# Patient Record
Sex: MALE | Race: WHITE | Employment: FULL TIME | URBAN - METROPOLITAN AREA
[De-identification: names, ages, dates, MRNs, and addresses within clinical notes are randomized per-mention and may not be internally consistent; named-entity substitution may affect disease eponyms.]

---

## 2024-05-29 ENCOUNTER — OFFICE VISIT (OUTPATIENT)
Dept: VASCULAR SURGERY | Age: 54
End: 2024-05-29
Payer: COMMERCIAL

## 2024-05-29 VITALS
RESPIRATION RATE: 16 BRPM | SYSTOLIC BLOOD PRESSURE: 151 MMHG | HEIGHT: 77 IN | BODY MASS INDEX: 37.19 KG/M2 | WEIGHT: 315 LBS | TEMPERATURE: 97.3 F | HEART RATE: 69 BPM | DIASTOLIC BLOOD PRESSURE: 71 MMHG

## 2024-05-29 DIAGNOSIS — I87.333 CHRONIC VENOUS HYPERTENSION (IDIOPATHIC) WITH ULCER AND INFLAMMATION OF BILATERAL LOWER EXTREMITY (HCC): Primary | ICD-10-CM

## 2024-05-29 PROCEDURE — 99203 OFFICE O/P NEW LOW 30 MIN: CPT | Performed by: SURGERY

## 2024-05-29 RX ORDER — GLIPIZIDE 5 MG/1
10 TABLET, FILM COATED, EXTENDED RELEASE ORAL DAILY
COMMUNITY
Start: 2024-04-30

## 2024-05-29 RX ORDER — AMLODIPINE BESYLATE 10 MG/1
10 TABLET ORAL DAILY
COMMUNITY

## 2024-05-29 RX ORDER — UREA 10 %
325 LOTION (ML) TOPICAL
COMMUNITY

## 2024-05-29 RX ORDER — ENALAPRIL MALEATE 20 MG/1
20 TABLET ORAL DAILY
COMMUNITY
Start: 2024-01-30

## 2024-05-29 RX ORDER — HYDROCHLOROTHIAZIDE 12.5 MG/1
12.5 TABLET ORAL DAILY
COMMUNITY

## 2024-05-29 RX ORDER — IBUPROFEN 200 MG
200 TABLET ORAL EVERY 6 HOURS PRN
COMMUNITY

## 2024-05-29 RX ORDER — M-VIT,TX,IRON,MINS/CALC/FOLIC 27MG-0.4MG
1 TABLET ORAL DAILY
COMMUNITY

## 2024-05-29 ASSESSMENT — ENCOUNTER SYMPTOMS
TROUBLE SWALLOWING: 0
VOICE CHANGE: 0
ABDOMINAL DISTENTION: 0
EYE PAIN: 0
VOMITING: 0
CHEST TIGHTNESS: 0
ABDOMINAL PAIN: 0
COUGH: 0
COLOR CHANGE: 0
SHORTNESS OF BREATH: 0

## 2024-05-29 NOTE — PROGRESS NOTES
Alcohol use: Never    Drug use: Never    Sexual activity: Not on file   Other Topics Concern    Not on file   Social History Narrative    Not on file     Social Determinants of Health     Financial Resource Strain: Not on file   Food Insecurity: Not on file   Transportation Needs: Not on file   Physical Activity: Not on file   Stress: Not on file   Social Connections: Not on file   Intimate Partner Violence: Not on file   Housing Stability: Not on file      No past surgical history on file.   Review of Systems   Constitutional:  Negative for activity change, appetite change, fever and unexpected weight change.   HENT:  Negative for congestion, trouble swallowing and voice change.    Eyes:  Negative for pain and visual disturbance.   Respiratory:  Negative for cough, chest tightness and shortness of breath.    Cardiovascular:  Negative for chest pain and palpitations.   Gastrointestinal:  Negative for abdominal distention, abdominal pain and vomiting.   Endocrine: Negative for cold intolerance and heat intolerance.   Genitourinary:  Negative for dysuria, flank pain and hematuria.   Musculoskeletal:  Negative for joint swelling and neck pain.   Skin:  Negative for color change and rash.   Allergic/Immunologic: Negative for immunocompromised state.   Neurological:  Negative for syncope, speech difficulty, weakness, numbness and headaches.   Hematological:  Negative for adenopathy.   Psychiatric/Behavioral:  Negative for agitation and confusion.        Vitals:    05/29/24 0859 05/29/24 0911   BP: (!) 162/91 (!) 151/71   Pulse: 76 69   Resp: 16    Temp: 97.3 °F (36.3 °C)    Weight: (!) 167.2 kg (368 lb 8 oz)    Height: 1.956 m (6' 5\")           Physical Exam  Cardiovascular:      Comments: On examination he has palpable distal pulses in both lower extremities at the dorsalis pedis.  He has ulceration at the posterior tibial and therefore these cannot be examined.  His feet are warm and adequately perfused.  He has

## 2024-05-29 NOTE — PATIENT INSTRUCTIONS
SURVEY:    You may be receiving a survey from [employer] regarding your visit today.    Please complete the survey to enable us to provide the highest quality of care to you and your family.    If you cannot score us a very good on any question, please call the office to discuss how we could have made your experience a very good one.    Thank you.      Please recheck your blood pressure when you go home and make sure you take your prescribed medication if applicable .  Please follow up with your PCP or ER if needed.

## 2024-06-04 ENCOUNTER — HOSPITAL ENCOUNTER (OUTPATIENT)
Dept: PHYSICAL THERAPY | Age: 54
Setting detail: THERAPIES SERIES
Discharge: HOME | End: 2024-06-04
Payer: COMMERCIAL

## 2024-06-04 PROCEDURE — 97161 PT EVAL LOW COMPLEX 20 MIN: CPT

## 2024-06-04 NOTE — PLAN OF CARE
Phone: [phone]        Fax: [phone]  [facility]  Physical Therapy  Unna Boot Plan of Care  Date: 2024    Patient Name: Owen Gomez          : 1970  (53 y.o.)  [facility] #: [id number]    Attending Physician: Anibal Burns MD    Diagnosis: Chronic venous hypertension with ulcer and inflammation of bilateral LE's, I87.333    Reason for Referral:  Unna Boot application to:  [] Right LE   [] Left LE   [x] Prince LE's     Objective Assessment: Patient reports a 15 year history of venous ulcers.  He reports a chronic L lower leg ulcer that has worsened and a new ulcer superior to it.  He also has two smaller ulcers around his R medial malleolus.  His superior left lower limb ulcer measures 10 cm x 4 cm, his chronic inferior L LE ulcer measures 11 cm x 8 cm. Two R LE ulcers measure: 2.3 cm x2 cm and 1.3 cm x1 cm.  He would benefit from bilateral unna boots to help heal venous ulcers.     Treatment:  [] Alginate Dressing  [] Antimicrobial Dressing (Silver Alginate)  [x] Zinc Paste Bandage (Unna Boot)  [] Collagen Dressing (Laura)  [x] Exudry Dressing  [] DermiSeptin (Moisturizing Cream)  [x] Ace Bandage cover  [x] Other: extra zinc paste    Instructed Patient/Caregiver:   [x] Proper wear and management of Unna Boot(s)  [x]  Proper monitoring of skin  []  Other:       Treatment Goals:    [x]  Met & Continue        []  Not Met       2024   [x] Patient will receive application and removal of Unna Boot(s) per order for wound management.    Treatment Plan:   [x]  Application of Unna Boot per physician order at a frequency of 3 times a week to:   []  Right lower extremity        []  Left lower extremity        [x]  Bilateral lower extremities   []  Other:      Rehab Potential: []  Poor   []  Fair   [x]  Good   []  Excellent     If there are any questions regarding the plan of care, please do not hesitate to contact the center.   Thank you for your referral.      Therapist’s Signature:  Christos

## 2024-06-07 ENCOUNTER — HOSPITAL ENCOUNTER (OUTPATIENT)
Dept: PHYSICAL THERAPY | Age: 54
Setting detail: THERAPIES SERIES
Discharge: HOME | End: 2024-06-07
Payer: COMMERCIAL

## 2024-06-07 NOTE — PROGRESS NOTES
Phone: [phone]        Fax: [phone]  [facility]  Physical Therapy  Unna Boot Treatment  Date: 2024    Patient Name: Owen Gomez          : 1970  (53 y.o.)  [facility] #: [id number]    Attending Physician: Anibal Burns MD     Diagnosis:   Chronic venous hypertension with ulcer and inflammation of bilateral LE's, I87.333   Treatment Diagnosis:    Reason for Referral:  Unna Boot application to:  [] Right LE   [] Left LE   [x] Prince LE's     Plan of Care/Recert ends    Objective Assessment:  Superior L LE ucler measures 10cm x4 cm, inferior L LE ulcer 11cm x8 cm. Two R LE ulcers measure 2.2cm x 2cm and 1.3cm x 1cm.     Treatment:  [] Alginate Dressing  [] Antimicrobial Dressing (Silver Alginate)  [x] Zinc Paste Bandage (Unna Boot)  [] Collagen Dressing (Laura)  [x] Exudry Dressing  [] DermiSeptin (Moisturizing Cream)  [x] Ace Bandage cover  [] Other:    Instructed Patient/Caregiver:   [x] Proper wear and management of Unna Boot(s)  [x]  Proper monitoring of skin  []  Other:       Treatment Goals:    [x]  Met & Continue        []  Not Met       2024   [x] Patient will receive application and removal of Unna Boot(s) per order for wound management.    Therapist/Assistant's Signature:  Ida Roman PTA           Date: 2024

## 2024-06-11 ENCOUNTER — TELEPHONE (OUTPATIENT)
Dept: VASCULAR SURGERY | Age: 54
End: 2024-06-11

## 2024-06-11 ENCOUNTER — HOSPITAL ENCOUNTER (OUTPATIENT)
Dept: PHYSICAL THERAPY | Age: 54
Discharge: HOME | End: 2024-06-11

## 2024-06-11 NOTE — TELEPHONE ENCOUNTER
Patient called and reported having problems with the PT services applying his UNNA BOOTS the last couple times and cutting them off because of being too tight and painful.     Called PT services and informed them of his complaints and they indicated they will call the patient , I informed them he already cut off today's boots. Due to not fitting well and being painful.

## 2024-06-11 NOTE — PROGRESS NOTES
Phone: [phone]        Fax: [phone]  [facility]  Physical Therapy  Unna Boot Treatment  Date: 2024    Patient Name: Owen Gomez          : 1970  (53 y.o.)  [facility] #: [id number]    Attending Physician: Anibal Burns MD     Diagnosis:   Chronic venous hypertension with ulcer and inflammation of bilateral LE's, I87.333   Treatment Diagnosis: Unna Boot     Reason for Referral:  Unna Boot application to:  [] Right LE   [] Left LE   [x] Prince LE's     Plan of Care/Recert ends    Objective Assessment:  Superior L LE ucler measures 10cm x4 cm, inferior L LE ulcer 11cm x8 cm. Two R LE ulcers measure 2.2cm x 2cm and 1.3cm x 1cm.     Treatment:  [] Alginate Dressing  [] Antimicrobial Dressing (Silver Alginate)  [x] Zinc Paste Bandage (Unna Boot)  [] Collagen Dressing (Laura)  [] Exudry Dressing  [] DermiSeptin (Moisturizing Cream)  [x] Ace Bandage cover  [] Other:    Instructed Patient/Caregiver:   [x] Proper wear and management of Unna Boot(s)  [x]  Proper monitoring of skin  []  Other:       Treatment Goals:    [x]  Met & Continue        []  Not Met       2024   [x] Patient will receive application and removal of Unna Boot(s) per order for wound management.    Therapist/Assistant's Signature:  Ida Roman PTA         Date: 2024

## 2024-06-14 ENCOUNTER — HOSPITAL ENCOUNTER (OUTPATIENT)
Dept: PHYSICAL THERAPY | Age: 54
Setting detail: THERAPIES SERIES
Discharge: HOME | End: 2024-06-14
Payer: COMMERCIAL

## 2024-06-14 PROCEDURE — 29580 STRAPPING UNNA BOOT: CPT

## 2024-06-14 NOTE — PROGRESS NOTES
Phone: [phone]        Fax: [phone]  [facility]  Physical Therapy  Unna Boot Treatment  Date: 2024    Patient Name: Owen Gomez          : 1970  (53 y.o.)  [facility] #: [id number]    Attending Physician: Anibal Burns MD    Diagnosis:   Chronic venous hypertension with ulcer and inflammation of bilateral LE's, I87.333   Treatment Diagnosis: Unna Boot     Reason for Referral:  Unna Boot application to:  [] Right LE   [] Left LE   [] Prince LE's       Plan of Care/Recert ends    Objective Assessment:  Pt educated on use of unna boots and wear cycle.  Pt  continues to report discomfort with use of boots and states he's had to remove them (especially L) d/t pain.  Wound remains the same with active drainage.  Pt requested only one layer on leg with wrap today d/t feeling like two wraps makes it tight.  Encouraged Pt for increased wraps to promote healing but only one wrap applied.  Will continue as tolerable.     Treatment:  [] Alginate Dressing  [] Antimicrobial Dressing (Silver Alginate)  [x] Zinc Paste Bandage (Unna Boot)  [] Collagen Dressing (Laura)  [] Exudry Dressing  [] DermiSeptin (Moisturizing Cream)  [x] Ace Bandage cover  [] Other:    Instructed Patient/Caregiver:   [x] Proper wear and management of Unna Boot(s)  [x]  Proper monitoring of skin  []  Other:       Treatment Goals:    [x]  Met & Continue        []  Not Met       2024   [x] Patient will receive application and removal of Unna Boot(s) per order for wound management.    Therapist/Assistant's Signature:  Obey Gold PTA           Date: 2024

## 2024-06-18 ENCOUNTER — HOSPITAL ENCOUNTER (OUTPATIENT)
Dept: PHYSICAL THERAPY | Age: 54
Setting detail: THERAPIES SERIES
Discharge: HOME | End: 2024-06-18
Payer: COMMERCIAL

## 2024-06-18 PROCEDURE — 29580 STRAPPING UNNA BOOT: CPT

## 2024-06-18 NOTE — PROGRESS NOTES
Phone: [phone]                                                                            Fax: [phone]    [facility]  Physical Therapy    Unna Boot Treatment    Date: 2024     Patient Name: Owen Gomez          : 1970  (53 y.o.)  [facility] #: [id number]     Attending Physician: Anibal Burns MD     Diagnosis:   Chronic venous hypertension with ulcer and inflammation of bilateral LE's, I87.333   Treatment Diagnosis: Unna Boot      Reason for Referral:  Unna Boot application to:  [] Right LE   [] Left LE   [] Prince LE's        Plan of Care/Recert ends     Objective Assessment:  Pt educated on use of unna boots and wear cycle.  Pt with fair tolerance to unna boots and wounds appear to slowly be approximating. Legs unwrapped, washed and new boots applied.  Will continue.   Treatment:  [] Alginate Dressing  [] Antimicrobial Dressing (Silver Alginate)  [x] Zinc Paste Bandage (Unna Boot)  [] Collagen Dressing (Laura)  [] Exudry Dressing  [] DermiSeptin (Moisturizing Cream)  [x] Ace Bandage cover  [] Other:     Instructed Patient/Caregiver:   [x]  Proper wear and management of Unna Boot(s)  [x]  Proper monitoring of skin  []  Other:        Treatment Goals:    [x]  Met & Continue        []  Not Met       2024   [x]  Patient will receive application and removal of Unna Boot(s) per order for wound management.     Therapist/Assistant's Signature:  Obey Gold PTA           Date: 2024

## 2024-06-21 ENCOUNTER — HOSPITAL ENCOUNTER (OUTPATIENT)
Dept: PHYSICAL THERAPY | Age: 54
Setting detail: THERAPIES SERIES
Discharge: HOME | End: 2024-06-21
Payer: COMMERCIAL

## 2024-06-21 PROCEDURE — 29580 STRAPPING UNNA BOOT: CPT

## 2024-06-21 NOTE — PROGRESS NOTES
Phone: [phone]                                                                             Fax: [phone]     [facility]  Physical Therapy     Unna Boot Treatment     Date: 2024     Patient Name: Owen Gomez          : 1970  (53 y.o.)  [facility] #: [id number]     Attending Physician: Anibal Burns MD     Diagnosis:   Chronic venous hypertension with ulcer and inflammation of bilateral LE's, I87.333   Treatment Diagnosis: Unna Boot      Reason for Referral:  Unna Boot application to:  [] Right LE   [] Left LE   [x] Prince LE's        Plan of Care/Recert ends     Objective Assessment:  Pt educated on use of unna boots and wear cycle.  Pt continues to display active drainage on BLE with L being worse than R.  Wounds are slowly closing.  Pt to follow up with Dr on Wednesday, will continue as Dr orders.   Treatment:  [] Alginate Dressing  [] Antimicrobial Dressing (Silver Alginate)  [x] Zinc Paste Bandage (Unna Boot)  [] Collagen Dressing (Laura)  [] Exudry Dressing  [] DermiSeptin (Moisturizing Cream)  [x] Ace Bandage cover  [] Other:     Instructed Patient/Caregiver:   [x]  Proper wear and management of Unna Boot(s)  [x]  Proper monitoring of skin  []  Other:        Treatment Goals:    [x]  Met & Continue        []  Not Met       2024   [x]  Patient will receive application and removal of Unna Boot(s) per order for wound management.     Therapist/Assistant's Signature:  Obey Gold PTA           Date: 2024

## 2024-06-26 ENCOUNTER — OFFICE VISIT (OUTPATIENT)
Dept: VASCULAR SURGERY | Age: 54
End: 2024-06-26
Payer: COMMERCIAL

## 2024-06-26 VITALS
BODY MASS INDEX: 37.19 KG/M2 | SYSTOLIC BLOOD PRESSURE: 157 MMHG | HEIGHT: 77 IN | TEMPERATURE: 97.1 F | RESPIRATION RATE: 16 BRPM | DIASTOLIC BLOOD PRESSURE: 82 MMHG | HEART RATE: 70 BPM | WEIGHT: 315 LBS

## 2024-06-26 DIAGNOSIS — I87.333 CHRONIC VENOUS HYPERTENSION WITH ULCER AND INFLAMMATION, BILATERAL (HCC): Primary | ICD-10-CM

## 2024-06-26 PROCEDURE — 29580 STRAPPING UNNA BOOT: CPT | Performed by: SURGERY

## 2024-06-26 NOTE — PATIENT INSTRUCTIONS
SURVEY:    You may be receiving a survey from [employer] regarding your visit today.    Please complete the survey to enable us to provide the highest quality of care to you and your family.    If you cannot score us a very good on any question, please call the office to discuss how we could have made your experience a very good one.    Thank you.        Please recheck your blood pressure when you go home and make sure you take your prescribed medication if applicable .  Please follow up with your PCP or ER if needed.

## 2024-06-27 NOTE — PROGRESS NOTES
[facility] VASCULAR PART OF [address]  [facility] [address]  Dept: [phone]     Patient: Owen Gomez  : 1970  MRN: [id number]  DOS: 2024            HPI:  Owen Gomez is a 53 y.o. male who returns to the office regarding his lower extremity venous stasis ulcerations.  His ulcerations have improved somewhat.  He has undergone a months worth of Unna boots on both lower extremities.  He is morbidly obese and has significant edema bilaterally.  He did have duplex examination at [facility] and 2024 revealing anterior branch great saphenous reflux on the left.  The great saphenous on the left is surgically absent.  The right side does not have significant reflux of the great saphenous but the small saphenous does have reflux.  I do not know the absolute values of these numbers.  This study may need to be repeated so that I can get a better idea of what is possible for his lower extremities.  I am more interested in healing the ulcerations currently however.    Review of Systems    Vitals:    24 1545 24 1551   BP: (!) 161/117 (!) 157/82   Pulse: 73 70   Resp: 16    Temp: 97.1 °F (36.2 °C)    Weight: (!) 168.3 kg (371 lb)    Height: 1.956 m (6' 5\")           Physical Exam  His examination is not largely changed except that the ulcerations have improved somewhat.  His swelling is also improved with better application of the Unna boots.  I reapplied the Unna boots today.  Assessment:  1. Chronic venous hypertension with ulcer and inflammation, bilateral (HCC)          Plan:  We will continue with Unna boots for the longer term and I will continue to see him on a monthly basis.  He understands and agrees.  I think he will ultimately need anterior branch great saphenous treatment either in the form of ligation with microphlebectomy or ablation.  I have no knowledge of how long

## 2024-06-28 ENCOUNTER — TELEPHONE (OUTPATIENT)
Dept: VASCULAR SURGERY | Age: 54
End: 2024-06-28

## 2024-06-28 ENCOUNTER — HOSPITAL ENCOUNTER (OUTPATIENT)
Dept: PHYSICAL THERAPY | Age: 54
Setting detail: THERAPIES SERIES
Discharge: HOME | End: 2024-06-28
Payer: COMMERCIAL

## 2024-06-28 PROCEDURE — 29580 STRAPPING UNNA BOOT: CPT

## 2024-06-28 NOTE — PLAN OF CARE
6/28/2024        To be completed by the referring physicians   By signing below, I agree to the above treatment plan.      Physician’s Signature:                        Date: 6/28/2024

## 2024-06-28 NOTE — TELEPHONE ENCOUNTER
Patient's appointment changed from 07/24/2024 to 07/31/2024 due to provider's schedule.   Patient has an order with PT for an Unna Boot. The last day scheduled is 07/22/24. Does the order need to be extended by a week to reflect the scheduling change?  Please advise. Thank you

## 2024-06-28 NOTE — PROGRESS NOTES
Phone: [phone]                                                                             Fax: [phone]     [facility]  Physical Therapy     Unna Boot Treatment     Date: 2024     Patient Name: Owen Gomez          : 1970  (53 y.o.)  [facility] #: [id number]     Attending Physician: Anibal Burns MD     Diagnosis:   Chronic venous hypertension with ulcer and inflammation of bilateral LE's, I87.333   Treatment Diagnosis: Unna Boot      Reason for Referral:  Unna Boot application to:  [] Right LE   [] Left LE   [x] Prince LE's        Plan of Care/Recert ends     Objective Assessment:  Pt educated on use of unna boots and wear cycle.  PT UPOC done today by Christos MARIN PT/ DPT. Wounds noted between Pt R toes today.  Educated Pt on sock wear cycle and change d/t wet socks on arrival on multiple occasions.     [] Alginate Dressing  [] Antimicrobial Dressing (Silver Alginate)  [x] Zinc Paste Bandage (Unna Boot)  [] Collagen Dressing (Laura)  [] Exudry Dressing  [] DermiSeptin (Moisturizing Cream)  [] Ace Bandage cover  [x] Other: coban cover over zinc dressing and white gauze      Instructed Patient/Caregiver:   [x]  Proper wear and management of Unna Boot(s)  [x]  Proper monitoring of skin  []  Other:        Treatment Goals:    [x]  Met & Continue        []  Not Met       2024   [x]  Patient will receive application and removal of Unna Boot(s) per order for wound management.     Therapist/Assistant's Signature:  Obey Gold PTA           Date: 2024

## 2024-07-03 ENCOUNTER — HOSPITAL ENCOUNTER (OUTPATIENT)
Dept: PHYSICAL THERAPY | Age: 54
Setting detail: THERAPIES SERIES
Discharge: HOME | End: 2024-07-03
Payer: COMMERCIAL

## 2024-07-03 PROCEDURE — 97140 MANUAL THERAPY 1/> REGIONS: CPT

## 2024-07-03 NOTE — PROGRESS NOTES
Phone: [phone]        Fax: [phone]  [facility]  Physical Therapy  Unna Boot Treatment  Date: 7/3/2024    Patient Name: Owen Gomez          : 1970  (53 y.o.)  [facility] #: [id number]    Attending Physician: Anibal Burns MD      Diagnosis:   Chronic venous hypertension with ulcer and inflammation of bilateral LE's, I87.333   Treatment Diagnosis: Unna Boot       Reason for Referral:  Unna Boot application to:  [] Right LE   [] Left LE   [x] Prince LE's       Objective Assessment:   Wounds noted between Pt R toes today. Pt. Had to discard L LE unnaboot on Monday d/t increased drainage. Cleansed B LE with mild soap and water, air dry followed by a spray cleanser.   Treatment:  [] Alginate Dressing  [] Antimicrobial Dressing (Silver Alginate)  [x] Zinc Paste Bandage (Unna Boot)  [] Collagen Dressing (Laura)  [] Exudry Dressing  [] DermiSeptin (Moisturizing Cream)  [] Ace Bandage cover  [x] Other:coban cover over zinc dressing and white gauze     Instructed Patient/Caregiver:   [x] Proper wear and management of Unna Boot(s)  [x]  Proper monitoring of skin  []  Other:       Treatment Goals:    [x]  Met & Continue        []  Not Met       7/3/2024   [x] Patient will receive application and removal of Unna Boot(s) per order for wound management.    Therapist/Assistant's Signature:  Radha Hickman PTA           Date: 7/3/2024

## 2024-07-05 ENCOUNTER — HOSPITAL ENCOUNTER (OUTPATIENT)
Dept: PHYSICAL THERAPY | Age: 54
Setting detail: THERAPIES SERIES
Discharge: HOME | End: 2024-07-05
Payer: COMMERCIAL

## 2024-07-05 PROCEDURE — 29580 STRAPPING UNNA BOOT: CPT

## 2024-07-05 NOTE — PROGRESS NOTES
Phone: [phone]        Fax: [phone]  [facility]  Physical Therapy  Unna Boot Treatment  Date: 2024    Patient Name: Owen Gomez          : 1970  (53 y.o.)  [facility] #: [id number]    Attending Physician: Anibal Burns MD    Diagnosis: Chronic venous hypertension with ulcer and inflammation of bilateral LE's, I87.333   Treatment Diagnosis: Unna Boot     Reason for Referral:  Unna Boot application to:  [] Right LE   [] Left LE   [x] Prince LE's      Plan of Care/Recert ends    Objective Assessment:   Pt. Educated on use of unna boots and wear cycle. Wounds noted between Pt R toes today. Cleansed B LE with mild soap and water and dried followed from cleanse.     Treatment:  [] Alginate Dressing  [] Antimicrobial Dressing (Silver Alginate)  [x] Zinc Paste Bandage (Unna Boot)  [] Collagen Dressing (Laura)  [] Exudry Dressing  [] DermiSeptin (Moisturizing Cream)  [] Ace Bandage cover  [] Other: coban cover over zinc dressing and white gauze     Instructed Patient/Caregiver:   [x] Proper wear and management of Unna Boot(s)  [x]  Proper monitoring of skin  []  Other:       Treatment Goals:    [x]  Met & Continue        []  Not Met       2024   [x] Patient will receive application and removal of Unna Boot(s) per order for wound management.    Therapist/Assistant's Signature:  Ida Roman PTA           Date: 2024

## 2024-07-08 ENCOUNTER — HOSPITAL ENCOUNTER (OUTPATIENT)
Dept: PHYSICAL THERAPY | Age: 54
Setting detail: THERAPIES SERIES
Discharge: HOME | End: 2024-07-08
Payer: COMMERCIAL

## 2024-07-08 PROCEDURE — 97140 MANUAL THERAPY 1/> REGIONS: CPT

## 2024-07-08 NOTE — PROGRESS NOTES
Phone: [phone]        Fax: [phone]  [facility]  Physical Therapy  Unna Boot Treatment  Date: 2024    Patient Name: Owen Gomez          : 1970  (53 y.o.)  [facility] #: [id number]    Attending Physician: Anibal Burns MD    Diagnosis: Chronic venous hypertension with ulcer and inflammation of bilateral LE's, I87.333   Treatment Diagnosis: Unna Boot    Reason for Referral:  Unna Boot application to:  [] Right LE   [] Left LE   [x] Prince LE's       Plan of Care/Recert ends    Objective Assessment:   Wounds noted between Pt R toes today. Pt. Was able to removed unnaboots this morning  and wash LEs in shower. B LEs cleansed with mild soap and water followed bu unnaboot application.   [] Alginate Dressing  [] Antimicrobial Dressing (Silver Alginate)  [x] Zinc Paste Bandage (Unna Boot)  [] Collagen Dressing (Laura)  [] Exudry Dressing  [] DermiSeptin (Moisturizing Cream)  [] Ace Bandage cover  [x] Other:cover over zinc dressing and white gauze     Instructed Patient/Caregiver:   [x] Proper wear and management of Unna Boot(s)  [x]  Proper monitoring of skin  []  Other:       Treatment Goals:    [x]  Met & Continue        []  Not Met       2024   [x] Patient will receive application and removal of Unna Boot(s) per order for wound management.    Therapist/Assistant's Signature:  Radha Hickman PTA           Date: 2024

## 2024-07-12 ENCOUNTER — HOSPITAL ENCOUNTER (OUTPATIENT)
Dept: PHYSICAL THERAPY | Age: 54
Setting detail: THERAPIES SERIES
Discharge: HOME | End: 2024-07-12
Payer: COMMERCIAL

## 2024-07-12 PROCEDURE — 29580 STRAPPING UNNA BOOT: CPT

## 2024-07-15 ENCOUNTER — HOSPITAL ENCOUNTER (OUTPATIENT)
Dept: PHYSICAL THERAPY | Age: 54
Setting detail: THERAPIES SERIES
Discharge: HOME | End: 2024-07-15
Payer: COMMERCIAL

## 2024-07-15 PROCEDURE — 97140 MANUAL THERAPY 1/> REGIONS: CPT

## 2024-07-15 NOTE — PROGRESS NOTES
Phone: [phone]        Fax: [phone]  [facility]  Physical Therapy  Unna Boot Treatment  Date: 7/15/2024    Patient Name: Owen Gomez          : 1970  (53 y.o.)  [facility] #: [id number]    Attending Physician: Anibal Burns MD    Diagnosis:  Chronic venous hypertension with ulcer and inflammation of bilateral LE's, I87.333   Treatment Diagnosis: Unna Boot      Reason for Referral:  Unna Boot application to:  [] Right LE   [] Left LE   [x] Prince LE's     Objective Assessment:   Pt. Was able to remove unnaboots this morning and wash LE's in shower. B LE's cleansed with mild soap and water followed by unnaboot application.     Treatment:  [] Alginate Dressing  [] Antimicrobial Dressing (Silver Alginate)  [x] Zinc Paste Bandage (Unna Boot)  [] Collagen Dressing (Laura)  [] Exudry Dressing  [] DermiSeptin (Moisturizing Cream)  [] Ace Bandage cover  [x] Other:cover over zinc dressing and white gauze     Instructed Patient/Caregiver:   [x] Proper wear and management of Unna Boot(s)  [x]  Proper monitoring of skin  []  Other:       Treatment Goals:    [x]  Met & Continue        []  Not Met       7/15/2024   [x] Patient will receive application and removal of Unna Boot(s) per order for wound management.    Therapist/Assistant's Signature:  Radha Hickman PTA           Date: 7/15/2024

## 2024-07-19 ENCOUNTER — HOSPITAL ENCOUNTER (OUTPATIENT)
Dept: PHYSICAL THERAPY | Age: 54
Setting detail: THERAPIES SERIES
Discharge: HOME | End: 2024-07-19
Payer: COMMERCIAL

## 2024-07-19 PROCEDURE — 29580 STRAPPING UNNA BOOT: CPT

## 2024-07-19 NOTE — PROGRESS NOTES
Phone: [phone]        Fax: [phone]  [facility]  Physical Therapy  Unna Boot Treatment  Date: 2024    Patient Name: Owen Gomez          : 1970  (53 y.o.)  [facility] #: [id number]    Attending Physician: Anibal Burns MD    Diagnosis:  Chronic venous hypertension with ulcer and inflammation of bilateral LE's, I87.333   Treatment Diagnosis: Unna Boot    Reason for Referral:  Unna Boot application to:  [] Right LE   [] Left LE   [x] Prince LE's      Plan of Care/Recert ends    Objective Assessment:  Pt. was able to remove unnaboots this morning and wash LE's in shower. B LE's cleansed with mild soap and water followed by unnaboot application. Lightest pressure applied to wrap per pt. request along with single layer of unna-z d/t continuous reports of wrapping being tight. Pt. encouraged to continue normal wrapping d/t slow healing wound.     Treatment:  [] Alginate Dressing  [] Antimicrobial Dressing (Silver Alginate)  [x] Zinc Paste Bandage (Unna Boot)  [] Collagen Dressing (Laura)  [] Exudry Dressing  [] DermiSeptin (Moisturizing Cream)  [] Ace Bandage cover  [x] Other: cover over zinc dressing and white gauze        Instructed Patient/Caregiver:   [x] Proper wear and management of Unna Boot(s)  [x]  Proper monitoring of skin  []  Other:       Treatment Goals:    [x]  Met & Continue        []  Not Met       2024   [x] Patient will receive application and removal of Unna Boot(s) per order for wound management.    Therapist/Assistant's Signature:  Ida Roman PTA           Date: 2024

## 2024-07-22 ENCOUNTER — HOSPITAL ENCOUNTER (OUTPATIENT)
Dept: PHYSICAL THERAPY | Age: 54
Setting detail: THERAPIES SERIES
Discharge: HOME | End: 2024-07-22
Payer: COMMERCIAL

## 2024-07-22 PROCEDURE — 97140 MANUAL THERAPY 1/> REGIONS: CPT

## 2024-07-22 NOTE — PROGRESS NOTES
Phone: [phone]        Fax: [phone]  [facility]  Physical Therapy  Unna Boot Treatment  Date: 2024    Patient Name: Owen Gomez          : 1970  (53 y.o.)  [facility] #: [id number]    Attending Physician: Anibal Burns MD    Diagnosis:  Chronic venous hypertension with ulcer and inflammation of bilateral LE's, I87.333   Treatment Diagnosis: Unna Boot    Reason for Referral:  Unna Boot application to:  [] Right LE   [] Left LE   [] Prince LE's       Objective Assessment:   PT. Able to remove unnaboots prior to treatment and wash LEs. B LEs cleansed with mild soap and water followed by unnaboot application. Measurements taken this date R LE medial shin 1.3cm x1 cm and one noted scab. L LE medial aspect of shin distal to knee, 6.6 cm x 8.3 cm. L medial aspect of ankle region 10cm x 6.3cm.  Lightest pressure applied to wrap per pt. request along with single layer of unna-z d/t continuous reports of wrapping being tight. Pt. encouraged to continue normal wrapping d/t slow healing wound.     Treatment:  [] Alginate Dressing  [] Antimicrobial Dressing (Silver Alginate)  [x] Zinc Paste Bandage (Unna Boot)  [] Collagen Dressing (Laura)  [] Exudry Dressing  [] DermiSeptin (Moisturizing Cream)  [] Ace Bandage cover  [x] Other:cover over zinc dressing and white gauze     Instructed Patient/Caregiver:   [x] Proper wear and management of Unna Boot(s)  [x]  Proper monitoring of skin  []  Other:       Treatment Goals:    [x]  Met & Continue        []  Not Met       2024   [x] Patient will receive application and removal of Unna Boot(s) per order for wound management.    Therapist/Assistant's Signature:  Radha Hickman PTA           Date: 2024

## 2024-07-26 ENCOUNTER — HOSPITAL ENCOUNTER (OUTPATIENT)
Dept: PHYSICAL THERAPY | Age: 54
Setting detail: THERAPIES SERIES
Discharge: HOME | End: 2024-07-26
Payer: COMMERCIAL

## 2024-07-26 PROCEDURE — 29580 STRAPPING UNNA BOOT: CPT

## 2024-07-26 NOTE — PROGRESS NOTES
Phone: [phone]        Fax: [phone]  [facility]  Physical Therapy  Unna Boot Treatment  Date: 2024    Patient Name: Owen Gomez          : 1970  (53 y.o.)  [facility] #: [id number]    Attending Physician: Anibal Burns MD    Diagnosis:  Chronic venous hypertension with ulcer and inflammation of bilateral LE's, I87.333   Treatment Diagnosis: Unna Boot     Reason for Referral:  Unna Boot application to:  [] Right LE   [] Left LE   [x] Prince LE's      Plan of Care/Recert ends    Objective Assessment:   Pt. reported that he had removed unnaboots on Wednesday after receiving them on Tuesday d/t increased tightness and discomfort. Pt. had them wrapped upon arrival with ace wrap and gauze. Noted with L LE of increased redness around wound with some discoloration noted in both wounds and a scab noticed on the dorsum of the foot. Informed pt. to contact PCP, wound care or ER d/t some concerns. Unnaboot was placed on patient for coverage. Lightest pressure applied to wrap per pt. request along with single layer of unna-z d/t continuous reports of wrapping being tight     Treatment:  [] Alginate Dressing  [] Antimicrobial Dressing (Silver Alginate)  [x] Zinc Paste Bandage (Unna Boot)  [] Collagen Dressing (Laura)  [] Exudry Dressing  [] DermiSeptin (Moisturizing Cream)  [] Ace Bandage cover  [x] Other: cover over zinc dressing and white gauze     Instructed Patient/Caregiver:   [x] Proper wear and management of Unna Boot(s)  [x]  Proper monitoring of skin  []  Other:       Treatment Goals:    [x]  Met & Continue        []  Not Met       2024   [x] Patient will receive application and removal of Unna Boot(s) per order for wound management.    Therapist/Assistant's Signature:  Ida Roman PTA           Date: 2024

## 2024-07-29 ENCOUNTER — HOSPITAL ENCOUNTER (OUTPATIENT)
Dept: PHYSICAL THERAPY | Age: 54
Setting detail: THERAPIES SERIES
Discharge: HOME | End: 2024-07-29
Payer: COMMERCIAL

## 2024-07-29 PROCEDURE — 97140 MANUAL THERAPY 1/> REGIONS: CPT

## 2024-07-29 NOTE — PROGRESS NOTES
Phone: [phone]        Fax: [phone]  [facility]  Physical Therapy  Unna Boot Treatment  Date: 2024    Patient Name: Owen Gomez          : 1970  (53 y.o.)  [facility] #: [id number]    Attending Physician: Anibal Burns MD    Diagnosis:   Chronic venous hypertension with ulcer and inflammation of bilateral LE's, I87.333   Treatment Diagnosis: Unna Boot        Reason for Referral:  Unna Boot application to:  [] Right LE   [] Left LE   [x] Prince LE's      Plan of Care/Recert ends    Objective Assessment:  Pt. Arrives with unna boots removed and LEs washed. Stated left unna boots on all weekend but ere \"tight.\" Pt. Stated he did contact PCP and was given antibiotic at this time.  Noted R LE has 2 spots on medial aspect of inner shin region, one is cabbed and second area open less than [id number]. L LE continues to have open wounds on medial aspect of L shin region down to L medial malleolus. B LE unna boots applied with light pressure applied and able to put one finger in wrap per pt. Request d/t reporting noted continuous increased tightness of wrapping.    Treatment:  [] Alginate Dressing  [] Antimicrobial Dressing (Silver Alginate)  [x] Zinc Paste Bandage (Unna Boot)  [] Collagen Dressing (Laura)  [] Exudry Dressing  [] DermiSeptin (Moisturizing Cream)  [] Ace Bandage cover  [x] Other:cover over zinc dressing and white gauze     Instructed Patient/Caregiver:   [x] Proper wear and management of Unna Boot(s)  [x]  Proper monitoring of skin  []  Other:       Treatment Goals:    [x]  Met & Continue        []  Not Met       2024   [x] Patient will receive application and removal of Unna Boot(s) per order for wound management.    Therapist/Assistant's Signature:  Radha Hickman PTA           Date: 2024

## 2024-07-31 ENCOUNTER — OFFICE VISIT (OUTPATIENT)
Dept: VASCULAR SURGERY | Age: 54
End: 2024-07-31

## 2024-07-31 VITALS
RESPIRATION RATE: 16 BRPM | BODY MASS INDEX: 38.36 KG/M2 | TEMPERATURE: 96.4 F | HEIGHT: 76 IN | DIASTOLIC BLOOD PRESSURE: 80 MMHG | WEIGHT: 315 LBS | SYSTOLIC BLOOD PRESSURE: 143 MMHG | HEART RATE: 74 BPM

## 2024-07-31 DIAGNOSIS — I87.333 CHRONIC VENOUS HYPERTENSION (IDIOPATHIC) WITH ULCER AND INFLAMMATION OF BILATERAL LOWER EXTREMITY (HCC): Primary | ICD-10-CM

## 2024-07-31 RX ORDER — DOXYCYCLINE HYCLATE 100 MG/1
100 CAPSULE ORAL 2 TIMES DAILY
COMMUNITY
Start: 2024-07-26 | End: 2024-08-02

## 2024-07-31 NOTE — PATIENT INSTRUCTIONS
SURVEY:    You may be receiving a survey from [employer] regarding your visit today.    Please complete the survey to enable us to provide the highest quality of care to you and your family.    If you cannot score us a very good on any question, please call the office to discuss how we could have made your experience a very good one.    Thank you.      Please recheck your blood pressure when you go home and make sure you take your prescribed medication if applicable .  Please follow up with your PCP or ER if needed.

## 2024-08-01 NOTE — PROGRESS NOTES
[facility] VASCULAR PART OF [address]  [facility] [address]  Dept: [phone]     Patient: Owen Gomez  : 1970  MRN: [id number]  DOS: 2024            HPI:  Owen Gomez is a 53 y.o. male who returns to the office regarding his lower extremity venous stasis ulcerations.  The right lower extremity is actually looking quite well whereas the left continues to have a large ulceration.  He is improving however.  He has islands of skin within the ulceration and things are making progress.  He is currently on doxycycline.  I would leave him on this long-term.    Review of Systems    Vitals:    24 1344 24 1348   BP: (!) 151/91 (!) 143/80   Pulse: 72 74   Resp: 16    Temp: (!) 96.4 °F (35.8 °C)    Weight: (!) 166.2 kg (366 lb 4.8 oz)    Height: 1.93 m (6' 4\")           Physical Exam  Exam is as above.  The ulcer on the left is still quite large and will need a lot of time with an Unna boot.  The right lower extremity ulceration is down to approximately a centimeter.  There is no sign of infection today.  Assessment:  1. Chronic venous hypertension (idiopathic) with ulcer and inflammation of bilateral lower extremity (HCC)          Plan:  At this point I ordered him with CircAid devices for future use on the right leg at least but also on the left in the distant future.  We rewrapped him with Unna boots bilaterally today and we will see him in another month.    Electronically signed by:  Anibal Burns MD

## 2024-08-02 ENCOUNTER — HOSPITAL ENCOUNTER (OUTPATIENT)
Dept: PHYSICAL THERAPY | Age: 54
Setting detail: THERAPIES SERIES
Discharge: HOME | End: 2024-08-02
Payer: COMMERCIAL

## 2024-08-02 PROCEDURE — 29580 STRAPPING UNNA BOOT: CPT

## 2024-08-02 NOTE — PLAN OF CARE
Phone: [phone]        Fax: [phone]  [facility]  Physical Therapy  Unna Boot Plan of Care  Date: 2024    Patient Name: Owen Goemz          : 1970  (53 y.o.)  [facility] #: [id number]    Attending Physician: Anibal Burns MD    Diagnosis: Chronic venous hypertension with ulcer and inflammation of bilateral LE's, I87.333    Reason for Referral:  Unna Boot application to:  [] Right LE   [] Left LE   [x] Prince LE's     Objective Assessment:  Patient's venous ulcers continue to improve slowly since beginning unnaboot. Patient's superior L lower limb ulcer measures 4x7cm and inferior ulcer is 5x9cm. Two R LE ulcers are dime sized and scabbed over. Will continue to apply B unna boots 2x/wk for up to 6 more weeks. Unnaboots applied by Ida Roman PTA, this date.    Treatment:  [] Alginate Dressing  [] Antimicrobial Dressing (Silver Alginate)  [x] Zinc Paste Bandage (Unna Boot)  [] Collagen Dressing (Laura)  [x] Exudry Dressing  [] DermiSeptin (Moisturizing Cream)  [] Ace Bandage cover  [x] Other: Coband cover    Instructed Patient/Caregiver:   [x] Proper wear and management of Unna Boot(s)  [x]  Proper monitoring of skin  []  Other:       Treatment Goals:    [x]  Met & Continue        []  Not Met       2024   [x] Patient will receive application and removal of Unna Boot(s) per order for wound management.    Treatment Plan:   [x]  Application of Unna Boot per physician order at a frequency of 2 times a week to:   []  Right lower extremity        []  Left lower extremity        [x]  Bilateral lower extremities   []  Other:      Rehab Potential: []  Poor   []  Fair   [x]  Good   []  Excellent     If there are any questions regarding the plan of care, please do not hesitate to contact the center.   Thank you for your referral.      Therapist’s Signature:  Alvino Cardona, PT, DPT            Date: 2024        To be completed by the referring physicians   By signing below, I agree to

## 2024-08-05 ENCOUNTER — HOSPITAL ENCOUNTER (OUTPATIENT)
Dept: PHYSICAL THERAPY | Age: 54
Setting detail: THERAPIES SERIES
Discharge: HOME | End: 2024-08-05
Payer: COMMERCIAL

## 2024-08-05 PROCEDURE — 29580 STRAPPING UNNA BOOT: CPT

## 2024-08-05 NOTE — PROGRESS NOTES
Phone: [phone]        Fax: [phone]  [facility]  Physical Therapy  Unna Boot Treatment  Date: 2024    Patient Name: Owen Gomez          : 1970  (53 y.o.)  [facility] #: [id number]    Attending Physician: Anibal Burns MD    Diagnosis: Diagnosis:   Chronic venous hypertension with ulcer and inflammation of bilateral LE's, I87.333   Treatment Diagnosis: Unna Boot     Reason for Referral:  Unna Boot application to:  [] Right LE   [] Left LE   [] Prince LE's     Objective Assessment:   Pt. Reports normal pain in L LE. Pt. Reports removing wraps  d/t increased tightness. B LE unna boots applied with light pressure applied and able to put one finger in wrap per pt. Request d/t reporting noted continuous increased tightness of wrapping.     Treatment:  [] Alginate Dressing  [] Antimicrobial Dressing (Silver Alginate)  [x] Zinc Paste Bandage (Unna Boot)  [] Collagen Dressing (Laura)  [] Exudry Dressing  [] DermiSeptin (Moisturizing Cream)  [] Ace Bandage cover  [x] Other:cover over zinc dressing and white gauze     Instructed Patient/Caregiver:   [x] Proper wear and management of Unna Boot(s)  [x]  Proper monitoring of skin  []  Other:       Treatment Goals:    [x]  Met & Continue        []  Not Met       2024   [x] Patient will receive application and removal of Unna Boot(s) per order for wound management.    Therapist/Assistant's Signature:  Radha Hickman PTA            Date: 2024

## 2024-08-09 ENCOUNTER — HOSPITAL ENCOUNTER (OUTPATIENT)
Dept: PHYSICAL THERAPY | Age: 54
Setting detail: THERAPIES SERIES
Discharge: HOME | End: 2024-08-09
Payer: COMMERCIAL

## 2024-08-09 PROCEDURE — 29580 STRAPPING UNNA BOOT: CPT

## 2024-08-09 NOTE — PROGRESS NOTES
Phone: [phone]        Fax: [phone]  [facility]  Physical Therapy  Unna Boot Treatment  Date: 2024    Patient Name: Owen Gomez          : 1970  (53 y.o.)  [facility] #: [id number]    Attending Physician: Anibal Burns MD    Diagnosis: Chronic venous hypertension with ulcer and inflammation of bilateral LE's, I87.333   Treatment Diagnosis: Unna Boot     Reason for Referral:  Unna Boot application to:  [] Right LE   [] Left LE   [x] Prince LE's      Plan of Care/Recert ends    Objective Assessment:  Pt. Was able to remove unnaboots prior to treatment and wash B LE this morning. B LEs cleansed with mild soap and water followed by unnaboot application. B LE unna boots applied with light pressure applied and able to put one finger in wrap per pt. Request d/t continue reporting of tightness.     Treatment:  [] Alginate Dressing  [] Antimicrobial Dressing (Silver Alginate)  [x] Zinc Paste Bandage (Unna Boot)  [] Collagen Dressing (Laura)  [] Exudry Dressing  [] DermiSeptin (Moisturizing Cream)  [] Ace Bandage cover  [x] Other:cover over zinc dressing and white gauze    Instructed Patient/Caregiver:   [x] Proper wear and management of Unna Boot(s)  [x]  Proper monitoring of skin  []  Other:       Treatment Goals:    [x]  Met & Continue        []  Not Met       2024   [x] Patient will receive application and removal of Unna Boot(s) per order for wound management.    Therapist/Assistant's Signature: Ida Roman PTA         Date: 2024

## 2024-08-12 ENCOUNTER — HOSPITAL ENCOUNTER (OUTPATIENT)
Dept: PHYSICAL THERAPY | Age: 54
Setting detail: THERAPIES SERIES
Discharge: HOME | End: 2024-08-12
Payer: COMMERCIAL

## 2024-08-12 PROCEDURE — 29580 STRAPPING UNNA BOOT: CPT

## 2024-08-16 ENCOUNTER — HOSPITAL ENCOUNTER (OUTPATIENT)
Dept: PHYSICAL THERAPY | Age: 54
Setting detail: THERAPIES SERIES
Discharge: HOME | End: 2024-08-16
Payer: COMMERCIAL

## 2024-08-16 PROCEDURE — 29580 STRAPPING UNNA BOOT: CPT

## 2024-08-16 NOTE — PROGRESS NOTES
Phone: [phone]        Fax: [phone]  [facility]  Physical Therapy  Unna Boot Treatment  Date: 2024    Patient Name: Owen Gomez          : 1970  (53 y.o.)  [facility] #: [id number]    Attending Physician: Anibal Burns MD    Diagnosis:   Chronic venous hypertension with ulcer and inflammation of bilateral LE's, I87.333   Treatment Diagnosis: Unnaboot    Reason for Referral:  Unna Boot application to:  [] Right LE   [] Left LE   [x] Prince LE's         Objective Assessment:  Pt arrived to therapy with old unaboots removed this morning. Pt reports removing boots this a.m. and showering prior to his appointment. B LE unaboots applied with little pressure and verbal pt confirmation that they fit good. L LE wound measurements: top wound 8.5x4cm with moderate purulent drainage, bottom wound 9.5x6.5cm with moderate purulent drainage.     Treatment:  [] Alginate Dressing  [] Antimicrobial Dressing (Silver Alginate)  [x] Zinc Paste Bandage (Unna Boot)  [] Collagen Dressing (Laura)  [] Exudry Dressing  [] DermiSeptin (Moisturizing Cream)  [] Ace Bandage cover  [x] Other: white gauze and co-band cover    Instructed Patient/Caregiver:   [x] Proper wear and management of Unna Boot(s)  [x]  Proper monitoring of skin  []  Other:       Treatment Goals:    [x]  Met & Continue        []  Not Met       2024   [x] Patient will receive application and removal of Unna Boot(s) per order for wound management.    Therapist/Assistant's Signature:  Alvino Cardona PT, DPT           Date: 2024

## 2024-08-19 ENCOUNTER — HOSPITAL ENCOUNTER (OUTPATIENT)
Dept: PHYSICAL THERAPY | Age: 54
Setting detail: THERAPIES SERIES
Discharge: HOME | End: 2024-08-19
Payer: COMMERCIAL

## 2024-08-19 PROCEDURE — 29580 STRAPPING UNNA BOOT: CPT

## 2024-08-19 NOTE — PROGRESS NOTES
Phone: [phone]        Fax: [phone]  [facility]  Physical Therapy  Unna Boot Treatment  Date: 2024    Patient Name: Owen Gomez          : 1970  (53 y.o.)  [facility] #: [id number]    Attending Physician: Anibal Burns MD    Diagnosis: Diagnosis:   Chronic venous hypertension with ulcer and inflammation of bilateral LE's, I87.333   Treatment Diagnosis: Unnaboot    Reason for Referral:  Unna Boot application to:  [] Right LE   [] Left LE   [x] Prince LE's       Objective Assessment:  Pt. Arrives to therapy with unna boots removed and able to shower and wash B LEs. B LEs washed with mild soap and water and B unna boots applied with decreased pressure on R LE d/t pt request.    Treatment:  [] Alginate Dressing  [] Antimicrobial Dressing (Silver Alginate)  [x] Zinc Paste Bandage (Unna Boot)  [] Collagen Dressing (Laura)  [] Exudry Dressing  [] DermiSeptin (Moisturizing Cream)  [] Ace Bandage cover  [x] Other:white gauze and co-band cover     Instructed Patient/Caregiver:   [x] Proper wear and management of Unna Boot(s)  [x]  Proper monitoring of skin  []  Other:       Treatment Goals:    [x]  Met & Continue        []  Not Met       2024   [x] Patient will receive application and removal of Unna Boot(s) per order for wound management.    Therapist/Assistant's Signature:  Radha Hickman PTA           Date: 2024

## 2024-08-23 ENCOUNTER — HOSPITAL ENCOUNTER (OUTPATIENT)
Dept: PHYSICAL THERAPY | Age: 54
Setting detail: THERAPIES SERIES
Discharge: HOME | End: 2024-08-23
Payer: COMMERCIAL

## 2024-08-23 PROCEDURE — 29580 STRAPPING UNNA BOOT: CPT

## 2024-08-23 NOTE — PROGRESS NOTES
Phone: [phone]        Fax: [phone]  [facility]  Physical Therapy  Unna Boot Treatment  Date: 2024    Patient Name: Owen Gomez          : 1970  (53 y.o.)  [facility] #: [id number]    Attending Physician: Anibal Burns MD    Diagnosis:   Chronic venous hypertension with ulcer and inflammation of bilateral LE's, I87.333   Treatment Diagnosis: Unnaboot    Reason for Referral:  Unna Boot application to:  [] Right LE   [] Left LE   [x] Prince LE's     Objective Assessment:  Pt arrives to therapy with unnaboots removed and B LE's washed and covered with nonstick pads. B unnaboots applied with decreased pressure on R LE per pt request.      Treatment:  [] Alginate Dressing  [] Antimicrobial Dressing (Silver Alginate)  [x] Zinc Paste Bandage (Unna Boot)  [] Collagen Dressing (Laura)  [] Exudry Dressing  [] DermiSeptin (Moisturizing Cream)  [] Ace Bandage cover  [x] Other: white gauze and co-band cover    Instructed Patient/Caregiver:   [x] Proper wear and management of Unna Boot(s)  [x]  Proper monitoring of skin  []  Other:       Treatment Goals:    [x]  Met & Continue        []  Not Met       2024   [x] Patient will receive application and removal of Unna Boot(s) per order for wound management.    Therapist/Assistant's Signature:  Alvino Cardona, PT, DPT           Date: 2024

## 2024-08-26 ENCOUNTER — HOSPITAL ENCOUNTER (OUTPATIENT)
Dept: PHYSICAL THERAPY | Age: 54
Setting detail: THERAPIES SERIES
Discharge: HOME | End: 2024-08-26
Payer: COMMERCIAL

## 2024-08-26 PROCEDURE — 29580 STRAPPING UNNA BOOT: CPT

## 2024-08-27 NOTE — PROGRESS NOTES
Phone: [phone]        Fax: [phone]  [facility]  Physical Therapy  Unna Boot Treatment  Date: 2024    Patient Name: Owen Gomez          : 1970  (53 y.o.)  [facility] #: [id number]    Attending Physician: Anibal Burns MD    Diagnosis:   Chronic venous hypertension with ulcer and inflammation of bilateral LE's, I87.333   Treatment Diagnosis: Unnaboot     Reason for Referral:  Unna Boot application to:  [] Right LE   [] Left LE   [x] Prince LE's       Plan of Care/Recert ends    Objective Assessment:  Pt received B unna boot this treatment. R LE presents with no drainage and one wound measuring 1cm x 1cm. Wound is currently scabbed over with no drainage. L LE has two open wounds. Both wounds with min/mod purulent exudate. Wounds measurincm x 4.5cm and 10cm x 8cm. After wrapping, pt verbally confirmed that they were not too tight.     Treatment:  [] Alginate Dressing  [] Antimicrobial Dressing (Silver Alginate)  [x] Zinc Paste Bandage (Unna Boot)  [] Collagen Dressing (Laura)  [] Exudry Dressing  [] DermiSeptin (Moisturizing Cream)  [] Ace Bandage cover  [x] Other: Rip    Instructed Patient/Caregiver:   [x] Proper wear and management of Unna Boot(s)  [x]  Proper monitoring of skin  []  Other:       Treatment Goals:    [x]  Met & Continue        []  Not Met       2024   [x] Patient will receive application and removal of Unna Boot(s) per order for wound management.    Therapist/Assistant's Signature:  Christos Hector PT, DPT           Date: 2024

## 2024-08-30 ENCOUNTER — APPOINTMENT (OUTPATIENT)
Dept: PHYSICAL THERAPY | Age: 54
End: 2024-08-30
Payer: COMMERCIAL

## 2025-01-31 ENCOUNTER — APPOINTMENT (OUTPATIENT)
Dept: GENERAL RADIOLOGY | Age: 55
End: 2025-01-31
Payer: MEDICAID

## 2025-01-31 ENCOUNTER — HOSPITAL ENCOUNTER (EMERGENCY)
Age: 55
Discharge: ANOTHER HOSPITAL | End: 2025-01-31
Payer: MEDICAID

## 2025-01-31 VITALS
WEIGHT: 315 LBS | HEART RATE: 100 BPM | DIASTOLIC BLOOD PRESSURE: 65 MMHG | TEMPERATURE: 97.8 F | OXYGEN SATURATION: 91 % | BODY MASS INDEX: 44.55 KG/M2 | SYSTOLIC BLOOD PRESSURE: 146 MMHG | RESPIRATION RATE: 18 BRPM

## 2025-01-31 DIAGNOSIS — L08.9 INFECTED SKIN ULCER WITH FAT LAYER EXPOSED (HCC): Primary | ICD-10-CM

## 2025-01-31 DIAGNOSIS — L98.492 INFECTED SKIN ULCER WITH FAT LAYER EXPOSED (HCC): Primary | ICD-10-CM

## 2025-01-31 LAB
ALBUMIN SERPL-MCNC: 3.3 G/DL (ref 3.5–5.2)
ALBUMIN/GLOB SERPL: 0.9 {RATIO} (ref 1–2.5)
ALP SERPL-CCNC: 76 U/L (ref 40–129)
ALT SERPL-CCNC: 26 U/L (ref 10–50)
ANION GAP SERPL CALCULATED.3IONS-SCNC: 11 MMOL/L (ref 9–16)
AST SERPL-CCNC: 18 U/L (ref 10–50)
BACTERIA URNS QL MICRO: ABNORMAL
BASOPHILS # BLD: 0.1 K/UL (ref 0–0.2)
BASOPHILS NFR BLD: 1 % (ref 0–2)
BILIRUB SERPL-MCNC: 0.5 MG/DL (ref 0–1.2)
BILIRUB UR QL STRIP: NEGATIVE
BUN SERPL-MCNC: 15 MG/DL (ref 6–20)
BUN/CREAT SERPL: 19 (ref 9–20)
CALCIUM SERPL-MCNC: 8.1 MG/DL (ref 8.6–10.4)
CHLORIDE SERPL-SCNC: 105 MMOL/L (ref 98–107)
CLARITY UR: CLEAR
CO2 SERPL-SCNC: 23 MMOL/L (ref 20–31)
COLOR UR: YELLOW
CREAT SERPL-MCNC: 0.8 MG/DL (ref 0.7–1.2)
EOSINOPHIL # BLD: 0.1 K/UL (ref 0–0.44)
EOSINOPHILS RELATIVE PERCENT: 1 % (ref 1–4)
EPI CELLS #/AREA URNS HPF: ABNORMAL /HPF (ref 0–5)
ERYTHROCYTE [DISTWIDTH] IN BLOOD BY AUTOMATED COUNT: 16.1 % (ref 11.8–14.4)
GFR, ESTIMATED: >90 ML/MIN/1.73M2
GLUCOSE SERPL-MCNC: 203 MG/DL (ref 74–99)
GLUCOSE UR STRIP-MCNC: NEGATIVE MG/DL
HCT VFR BLD AUTO: 32.3 % (ref 40.7–50.3)
HGB BLD-MCNC: 9.9 G/DL (ref 13–17)
HGB UR QL STRIP.AUTO: NEGATIVE
IMM GRANULOCYTES # BLD AUTO: 0 K/UL (ref 0–0.3)
IMM GRANULOCYTES NFR BLD: 0 %
KETONES UR STRIP-MCNC: NEGATIVE MG/DL
LACTATE BLDV-SCNC: 1.8 MMOL/L (ref 0.5–1.9)
LEUKOCYTE ESTERASE UR QL STRIP: NEGATIVE
LYMPHOCYTES NFR BLD: 3.07 K/UL (ref 1.1–3.7)
LYMPHOCYTES RELATIVE PERCENT: 32 % (ref 24–43)
MCH RBC QN AUTO: 24.4 PG (ref 25.2–33.5)
MCHC RBC AUTO-ENTMCNC: 30.7 G/DL (ref 28.4–34.8)
MCV RBC AUTO: 79.8 FL (ref 82.6–102.9)
MONOCYTES NFR BLD: 1.54 K/UL (ref 0.1–1.2)
MONOCYTES NFR BLD: 16 % (ref 3–12)
MORPHOLOGY: NORMAL
MUCOUS THREADS URNS QL MICRO: ABNORMAL
NEUTROPHILS NFR BLD: 50 % (ref 36–65)
NEUTS SEG NFR BLD: 4.79 K/UL (ref 1.5–8.1)
NITRITE UR QL STRIP: NEGATIVE
NRBC BLD-RTO: 0 PER 100 WBC
PH UR STRIP: 6 [PH] (ref 5–9)
PLATELET # BLD AUTO: 270 K/UL (ref 138–453)
PMV BLD AUTO: 9.4 FL (ref 8.1–13.5)
POTASSIUM SERPL-SCNC: 4.3 MMOL/L (ref 3.7–5.3)
PROT SERPL-MCNC: 6.9 G/DL (ref 6.6–8.7)
PROT UR STRIP-MCNC: ABNORMAL MG/DL
RBC # BLD AUTO: 4.05 M/UL (ref 4.21–5.77)
RBC #/AREA URNS HPF: ABNORMAL /HPF (ref 0–2)
SODIUM SERPL-SCNC: 139 MMOL/L (ref 136–145)
SP GR UR STRIP: >1.03 (ref 1.01–1.02)
TROPONIN I SERPL HS-MCNC: 13 NG/L (ref 0–22)
UROBILINOGEN UR STRIP-ACNC: NORMAL EU/DL (ref 0–1)
WBC #/AREA URNS HPF: ABNORMAL /HPF (ref 0–5)
WBC OTHER # BLD: 9.6 K/UL (ref 3.5–11.3)

## 2025-01-31 PROCEDURE — 80053 COMPREHEN METABOLIC PANEL: CPT

## 2025-01-31 PROCEDURE — 93005 ELECTROCARDIOGRAM TRACING: CPT

## 2025-01-31 PROCEDURE — 6360000002 HC RX W HCPCS

## 2025-01-31 PROCEDURE — 83605 ASSAY OF LACTIC ACID: CPT

## 2025-01-31 PROCEDURE — 87154 CUL TYP ID BLD PTHGN 6+ TRGT: CPT

## 2025-01-31 PROCEDURE — 87075 CULTR BACTERIA EXCEPT BLOOD: CPT

## 2025-01-31 PROCEDURE — 2580000003 HC RX 258

## 2025-01-31 PROCEDURE — 87070 CULTURE OTHR SPECIMN AEROBIC: CPT

## 2025-01-31 PROCEDURE — 87205 SMEAR GRAM STAIN: CPT

## 2025-01-31 PROCEDURE — 84484 ASSAY OF TROPONIN QUANT: CPT

## 2025-01-31 PROCEDURE — 36415 COLL VENOUS BLD VENIPUNCTURE: CPT

## 2025-01-31 PROCEDURE — 87040 BLOOD CULTURE FOR BACTERIA: CPT

## 2025-01-31 PROCEDURE — 73590 X-RAY EXAM OF LOWER LEG: CPT

## 2025-01-31 PROCEDURE — 85025 COMPLETE CBC W/AUTO DIFF WBC: CPT

## 2025-01-31 PROCEDURE — 81001 URINALYSIS AUTO W/SCOPE: CPT

## 2025-01-31 PROCEDURE — 71045 X-RAY EXAM CHEST 1 VIEW: CPT

## 2025-01-31 PROCEDURE — 99285 EMERGENCY DEPT VISIT HI MDM: CPT

## 2025-01-31 RX ORDER — ONDANSETRON 2 MG/ML
4 INJECTION INTRAMUSCULAR; INTRAVENOUS ONCE
Status: COMPLETED | OUTPATIENT
Start: 2025-01-31 | End: 2025-01-31

## 2025-01-31 RX ORDER — MORPHINE SULFATE 4 MG/ML
4 INJECTION, SOLUTION INTRAMUSCULAR; INTRAVENOUS ONCE
Status: COMPLETED | OUTPATIENT
Start: 2025-01-31 | End: 2025-01-31

## 2025-01-31 RX ORDER — 0.9 % SODIUM CHLORIDE 0.9 %
1000 INTRAVENOUS SOLUTION INTRAVENOUS ONCE
Status: COMPLETED | OUTPATIENT
Start: 2025-01-31 | End: 2025-01-31

## 2025-01-31 RX ADMIN — MORPHINE SULFATE 4 MG: 4 INJECTION, SOLUTION INTRAMUSCULAR; INTRAVENOUS at 13:35

## 2025-01-31 RX ADMIN — MORPHINE SULFATE 4 MG: 4 INJECTION, SOLUTION INTRAMUSCULAR; INTRAVENOUS at 08:34

## 2025-01-31 RX ADMIN — SODIUM CHLORIDE 1000 ML: 9 INJECTION, SOLUTION INTRAVENOUS at 07:44

## 2025-01-31 RX ADMIN — ONDANSETRON 4 MG: 2 INJECTION, SOLUTION INTRAMUSCULAR; INTRAVENOUS at 08:34

## 2025-01-31 RX ADMIN — CEFEPIME 2000 MG: 2 INJECTION, POWDER, FOR SOLUTION INTRAVENOUS at 07:43

## 2025-01-31 RX ADMIN — VANCOMYCIN HYDROCHLORIDE 2500 MG: 1 INJECTION, POWDER, LYOPHILIZED, FOR SOLUTION INTRAVENOUS at 08:21

## 2025-01-31 ASSESSMENT — PAIN DESCRIPTION - ORIENTATION
ORIENTATION: LEFT;LOWER
ORIENTATION: LEFT;LOWER
ORIENTATION: LEFT

## 2025-01-31 ASSESSMENT — PAIN SCALES - GENERAL
PAINLEVEL_OUTOF10: 6
PAINLEVEL_OUTOF10: 9
PAINLEVEL_OUTOF10: 10
PAINLEVEL_OUTOF10: 9

## 2025-01-31 ASSESSMENT — PAIN DESCRIPTION - LOCATION
LOCATION: LEG;ANKLE
LOCATION: LEG
LOCATION: LEG

## 2025-01-31 ASSESSMENT — PAIN - FUNCTIONAL ASSESSMENT: PAIN_FUNCTIONAL_ASSESSMENT: 0-10

## 2025-01-31 NOTE — ED PROVIDER NOTES
[facility] EMERGENCY DEPARTMENT  EMERGENCY DEPARTMENT ENCOUNTER        Pt Name: Owen Gomez  MRN: [id number]  Birthdate 1970  Date of evaluation: 1/31/2025  Provider: Kandi Vogt MD  PCP: Maldonado Cotto APRN - CNP  Note Started: 8:22 AM EST 1/31/25    CHIEF COMPLAINT       Chief Complaint   Patient presents with    Skin Ulcer     Patient states this AM ulcer on the lower left leg broke open when he was removing the old dressing. Per EMS, large area of toilet paper soaked with blood. Per was suppose to schedule follow-up appointment for next week.       HISTORY OF PRESENT ILLNESS: 1 or more Elements     Owen Gomez is a 54 y.o. male past medical history of hypertension, hyperlipidemia, diabetes lymphedema, venous stasis ulcer of the right calf with several layers exposed who presents skin ulcer.  States that this morning when he was removing the dressing he noticed significant amount of bleeding, states that he felt that the bleeding was shooting across the room and was soaked through blood.  States that he had his bag in a plastic bag collecting the blood when the wife did wrap him up while waiting for EMS and bleeding has now stopped.  Patient states that he does follow with [facility] and wound care at home and is supposed to have a washout of his wound at [facility].  Patient states that he just recently finished antibiotics denies any fevers or chills.  He reports that this wound has been ongoing for the past 15 years and has been following with wound care outpatient.  Denies any fever, chills, n/v, headache, dizziness, vision changes, neck tenderness or stiffness, weakness, cp, palpitations, sob, cough, abd pain, dysuria, hematuria, diarrhea, constipation, bloody stools.    Chart from wound care and note from January 29, 2025 the patient will need urgent inpatient admission for surgical debridement infectious disease consult and vascular cell consult.  At that time patient did decline surgical

## 2025-01-31 NOTE — ED NOTES
Patient accepted to [facility] Continuity of Care. Patient going to the ER Dr [facility] Accepting. Dr [facility] agreed to transfer. Phone number for report: [phone]

## 2025-01-31 NOTE — ED NOTES
[facility] called back with [facility] at [facility] and connected with Dr Vogt

## 2025-01-31 NOTE — ED NOTES
Patient provided with urinal at this time, he is aware that he should refrain from standing on left leg. Call light within reach.

## 2025-02-01 LAB
EKG ATRIAL RATE: 97 BPM
EKG P AXIS: 21 DEGREES
EKG P-R INTERVAL: 136 MS
EKG Q-T INTERVAL: 416 MS
EKG QRS DURATION: 144 MS
EKG QTC CALCULATION (BAZETT): 528 MS
EKG R AXIS: -35 DEGREES
EKG T AXIS: -14 DEGREES
EKG VENTRICULAR RATE: 97 BPM
MICROORGANISM SPEC CULT: ABNORMAL
SERVICE CMNT-IMP: ABNORMAL
SPECIMEN DESCRIPTION: ABNORMAL

## 2025-02-02 LAB
MICROORGANISM SPEC CULT: ABNORMAL
MICROORGANISM SPEC CULT: NORMAL
MICROORGANISM/AGENT SPEC: ABNORMAL
MICROORGANISM/AGENT SPEC: ABNORMAL
SERVICE CMNT-IMP: NORMAL
SPECIMEN DESCRIPTION: ABNORMAL
SPECIMEN DESCRIPTION: NORMAL

## 2025-02-03 LAB
MICROORGANISM SPEC CULT: ABNORMAL
MICROORGANISM/AGENT SPEC: ABNORMAL
MICROORGANISM/AGENT SPEC: ABNORMAL
SPECIMEN DESCRIPTION: ABNORMAL

## 2025-02-05 LAB
MICROORGANISM SPEC CULT: NORMAL
SERVICE CMNT-IMP: NORMAL
SPECIMEN DESCRIPTION: NORMAL